# Patient Record
Sex: FEMALE | Race: WHITE | NOT HISPANIC OR LATINO | Employment: PART TIME | URBAN - METROPOLITAN AREA
[De-identification: names, ages, dates, MRNs, and addresses within clinical notes are randomized per-mention and may not be internally consistent; named-entity substitution may affect disease eponyms.]

---

## 2019-02-22 ENCOUNTER — OFFICE VISIT (OUTPATIENT)
Dept: URGENT CARE | Facility: PHYSICIAN GROUP | Age: 24
End: 2019-02-22
Payer: COMMERCIAL

## 2019-02-22 VITALS
OXYGEN SATURATION: 99 % | SYSTOLIC BLOOD PRESSURE: 120 MMHG | TEMPERATURE: 99 F | BODY MASS INDEX: 26.41 KG/M2 | WEIGHT: 131 LBS | HEART RATE: 79 BPM | HEIGHT: 59 IN | DIASTOLIC BLOOD PRESSURE: 68 MMHG

## 2019-02-22 DIAGNOSIS — M79.672 BILATERAL FOOT PAIN: ICD-10-CM

## 2019-02-22 DIAGNOSIS — M79.671 BILATERAL FOOT PAIN: ICD-10-CM

## 2019-02-22 DIAGNOSIS — R53.83 FATIGUE, UNSPECIFIED TYPE: ICD-10-CM

## 2019-02-22 DIAGNOSIS — M25.512 ACUTE PAIN OF LEFT SHOULDER: ICD-10-CM

## 2019-02-22 PROCEDURE — 99204 OFFICE O/P NEW MOD 45 MIN: CPT | Performed by: PHYSICIAN ASSISTANT

## 2019-02-22 RX ORDER — ACETAMINOPHEN 325 MG/1
650 TABLET ORAL EVERY 4 HOURS PRN
COMMUNITY

## 2019-02-22 ASSESSMENT — ENCOUNTER SYMPTOMS
RESPIRATORY NEGATIVE: 1
GASTROINTESTINAL NEGATIVE: 1
CONSTITUTIONAL NEGATIVE: 1
CARDIOVASCULAR NEGATIVE: 1

## 2019-02-22 NOTE — PROGRESS NOTES
"Subjective:      Makeda Arguello is a 23 y.o. female who presents with Foot Pain (bilateral foot pain, unable to apply pressure, tingling, L Shoulder pain, limited motion, x1 month )        HPI   1.  1 month of worsening bilateral foot pain.  Patient states pain was so bad yesterday she could barely walk.  She states her \"pinky toes went purple and had some tingling\"  No new shoes, job.  She is running a lot but she feels it is not excessive or more than her normal although is training for the [employer].  She bought gel inserts but did not help.    Patient states that her symptoms are worse in the morning and gets better throughout the day.   No heel pain specifically.   OTC medications not really helpful     2. Left shoulder pain x 1 month.  ROM is limited.   No specific injury or moment she felt it strained. Does go the gym and lift weights and feels this has been limited lately. .   No weakness, numbness or tingling of the left hand.    She has been taking Tylenol and Ibuprofen but not sure if helps too much.  No chronic shoulder issues or previous injury.       Patient notes concern for poor wound healing and admits her mom placed concern about her foot pain could be related to DM.     Review of Systems   Constitutional: Negative.    Respiratory: Negative.    Cardiovascular: Negative.    Gastrointestinal: Negative.    Musculoskeletal:        SEE HPI   Skin: Negative.    Neurological:        SEE HPI   Endo/Heme/Allergies: Negative.    All other systems reviewed and are negative.       PMH:  has no past medical history on file.  MEDS:   Current Outpatient Prescriptions:   •  Ibuprofen (ADVIL PO), Take  by mouth., Disp: , Rfl:   •  acetaminophen (TYLENOL) 325 MG Tab, Take 650 mg by mouth every four hours as needed., Disp: , Rfl:   ALLERGIES: No Known Allergies  SURGHX: History reviewed. No pertinent surgical history.  SOCHX:  reports that she has never smoked. She has never used smokeless tobacco. She " "reports that she uses drugs, including Marijuana. She reports that she does not drink alcohol.  FH: Family history was reviewed, no pertinent findings to report   Objective:     /68 (BP Location: Left arm, Patient Position: Sitting, BP Cuff Size: Adult)   Pulse 79   Temp 37.2 °C (99 °F) (Temporal)   Ht 1.499 m (4' 11\")   Wt 59.4 kg (131 lb)   SpO2 99%   BMI 26.46 kg/m²      Physical Exam   Constitutional: She is oriented to person, place, and time. She appears well-developed and well-nourished. No distress.   HENT:   Head: Normocephalic and atraumatic.   Right Ear: External ear normal.   Left Ear: External ear normal.   Nose: Nose normal.   Eyes: Conjunctivae and EOM are normal.   Neck: Normal range of motion. Neck supple.   Cardiovascular: Normal rate and regular rhythm.    Pulmonary/Chest: Effort normal and breath sounds normal.   Musculoskeletal:        Left shoulder: She exhibits decreased range of motion (by 50% in all planes both passive and active. ). She exhibits no bony tenderness and no swelling.        Arms:       Right foot: There is tenderness (mild diffuse plantar. ). There is normal range of motion, no bony tenderness and normal capillary refill.        Left foot: There is tenderness (mild diffuse plantar). There is normal range of motion, no swelling and normal capillary refill.   Neurological: She is alert and oriented to person, place, and time.   Skin: Skin is warm and dry. No rash noted.   Psychiatric: She has a normal mood and affect. Her behavior is normal.   Vitals reviewed.    Component Results     Component Value Ref Range & Units Status   WBC 8.8  3.4 - 10.8 x10E3/uL Final   RBC 5.03  3.77 - 5.28 x10E6/uL Final   Hemoglobin 14.9  11.1 - 15.9 g/dL Final   Hematocrit 42.7  34.0 - 46.6 % Final   MCV 85  79 - 97 fL Final   MCH 29.6  26.6 - 33.0 pg Final   MCHC 34.9  31.5 - 35.7 g/dL Final   RDW 14.5  12.3 - 15.4 % Final   Neutrophils-Polys 64  Not Estab. % Final   Lymphocytes 28  " Not Estab. % Final   Monocytes 6  Not Estab. % Final   Eosinophils 1  Not Estab. % Final   Basophils 1  Not Estab. % Final   Immature Cells CANCELED      Comment:   Result canceled by the ancillary   Neutrophils (Absolute) 5.7  1.4 - 7.0 x10E3/uL Final   Lymphs (Absolute) 2.4  0.7 - 3.1 x10E3/uL Final   Monos (Absolute) 0.6  0.1 - 0.9 x10E3/uL Final   Eos (Absolute) 0.1  0.0 - 0.4 x10E3/uL Final   Baso (Absolute) 0.1  0.0 - 0.2 x10E3/uL Final   Immature Granulocytes 0  Not Estab. % Final   Immature Granulocytes (abs) 0.0  0.0 - 0.1 x10E3/uL Final         Component Results     Component Value Ref Range & Units Status   Glucose 90  65 - 99 mg/dL Final   Bun 13  6 - 20 mg/dL Final   Creatinine 0.71  0.57 - 1.00 mg/dL Final   GFR If Non African American 121  >59 mL/min/1.73 Final   GFR If African American 140  >59 mL/min/1.73 Final   Bun-Creatinine Ratio 18  9 - 23 Final   Sodium 139  134 - 144 mmol/L Final   Potassium 4.9  3.5 - 5.2 mmol/L Final   Chloride 104  96 - 106 mmol/L Final   Co2 21  20 - 29 mmol/L Final   Calcium 9.8  8.7 - 10.2 mg/dL Final   Total Protein 8.2  6.0 - 8.5 g/dL Final   Albumin 4.9  3.5 - 5.5 g/dL Final   Globulin 3.3  1.5 - 4.5 g/dL Final   A-G Ratio 1.5  1.2 - 2.2 Final   Total Bilirubin 0.5  0.0 - 1.2 mg/dL Final   Alkaline Phosphatase 63  39 - 117 IU/L Final   AST(SGOT) 30  0 - 40 IU/L Final   ALT(SGPT) 36   0 - 32 IU/L Final     Component Results     Component Value Ref Range & Units Status   Sed Rate Westergren 2  0 - 32 mm/hr Final        Assessment/Plan:     1. Acute pain of left shoulder  MethylPREDNISolone (MEDROL DOSEPAK) 4 MG Tablet Therapy Pack    REFERRAL TO SPORTS MEDICINE   2. Bilateral foot pain  WESTERGREN SED RATE   3. Fatigue, unspecified type  CBC WITH DIFFERENTIAL    Comp Metabolic Panel       -labs grossly unremarkable.   -discussed with patient to d/c running at this time and switch to lower impact cardio for now.   Also recommend shoulder weight lifting for now.       -Rest, stretches.    -Medrol dose pack for acute inflammation   -referral to follow up with Sports Med.      Supportive care, differential diagnoses, and indications for immediate follow-up discussed with patient.   Pathogenesis of diagnosis discussed including typical length and natural progression.   Instructed to return to clinic or nearest emergency department for any change in condition, further concerns, or worsening of symptoms.  Patient states understanding of the plan of care and discharge instructions.        Meenu Aranda P.A.-C.

## 2019-02-24 LAB
ALBUMIN SERPL-MCNC: 4.9 G/DL (ref 3.5–5.5)
ALBUMIN/GLOB SERPL: 1.5 {RATIO} (ref 1.2–2.2)
ALP SERPL-CCNC: 63 IU/L (ref 39–117)
ALT SERPL-CCNC: 36 IU/L (ref 0–32)
AMBIG ABBREV CMP14 DFLT   977206: NORMAL
AST SERPL-CCNC: 30 IU/L (ref 0–40)
BASOPHILS # BLD AUTO: 0.1 X10E3/UL (ref 0–0.2)
BASOPHILS NFR BLD AUTO: 1 %
BILIRUB SERPL-MCNC: 0.5 MG/DL (ref 0–1.2)
BUN SERPL-MCNC: 13 MG/DL (ref 6–20)
BUN/CREAT SERPL: 18 (ref 9–23)
CALCIUM SERPL-MCNC: 9.8 MG/DL (ref 8.7–10.2)
CHLORIDE SERPL-SCNC: 104 MMOL/L (ref 96–106)
CO2 SERPL-SCNC: 21 MMOL/L (ref 20–29)
CREAT SERPL-MCNC: 0.71 MG/DL (ref 0.57–1)
EOSINOPHIL # BLD AUTO: 0.1 X10E3/UL (ref 0–0.4)
EOSINOPHIL NFR BLD AUTO: 1 %
ERYTHROCYTE [DISTWIDTH] IN BLOOD BY AUTOMATED COUNT: 14.5 % (ref 12.3–15.4)
ERYTHROCYTE [SEDIMENTATION RATE] IN BLOOD BY WESTERGREN METHOD: 2 MM/HR (ref 0–32)
GLOBULIN SER CALC-MCNC: 3.3 G/DL (ref 1.5–4.5)
GLUCOSE SERPL-MCNC: 90 MG/DL (ref 65–99)
HCT VFR BLD AUTO: 42.7 % (ref 34–46.6)
HGB BLD-MCNC: 14.9 G/DL (ref 11.1–15.9)
IMM GRANULOCYTES # BLD AUTO: 0 X10E3/UL (ref 0–0.1)
IMM GRANULOCYTES NFR BLD AUTO: 0 %
IMMATURE CELLS  115398: NORMAL
LYMPHOCYTES # BLD AUTO: 2.4 X10E3/UL (ref 0.7–3.1)
LYMPHOCYTES NFR BLD AUTO: 28 %
MCH RBC QN AUTO: 29.6 PG (ref 26.6–33)
MCHC RBC AUTO-ENTMCNC: 34.9 G/DL (ref 31.5–35.7)
MCV RBC AUTO: 85 FL (ref 79–97)
MONOCYTES # BLD AUTO: 0.6 X10E3/UL (ref 0.1–0.9)
MONOCYTES NFR BLD AUTO: 6 %
MORPHOLOGY BLD-IMP: NORMAL
NEUTROPHILS # BLD AUTO: 5.7 X10E3/UL (ref 1.4–7)
NEUTROPHILS NFR BLD AUTO: 64 %
NRBC BLD AUTO-RTO: NORMAL %
POTASSIUM SERPL-SCNC: 4.9 MMOL/L (ref 3.5–5.2)
PROT SERPL-MCNC: 8.2 G/DL (ref 6–8.5)
RBC # BLD AUTO: 5.03 X10E6/UL (ref 3.77–5.28)
SODIUM SERPL-SCNC: 139 MMOL/L (ref 134–144)
WBC # BLD AUTO: 8.8 X10E3/UL (ref 3.4–10.8)

## 2019-02-24 RX ORDER — METHYLPREDNISOLONE 4 MG/1
TABLET ORAL
Qty: 21 TAB | Refills: 0 | Status: SHIPPED | OUTPATIENT
Start: 2019-02-24 | End: 2019-09-02

## 2019-03-03 ENCOUNTER — OFFICE VISIT (OUTPATIENT)
Dept: URGENT CARE | Facility: PHYSICIAN GROUP | Age: 24
End: 2019-03-03
Payer: COMMERCIAL

## 2019-03-03 VITALS
HEART RATE: 86 BPM | DIASTOLIC BLOOD PRESSURE: 80 MMHG | OXYGEN SATURATION: 97 % | BODY MASS INDEX: 25.36 KG/M2 | TEMPERATURE: 99.3 F | WEIGHT: 125.8 LBS | HEIGHT: 59 IN | SYSTOLIC BLOOD PRESSURE: 120 MMHG

## 2019-03-03 DIAGNOSIS — M79.672 BILATERAL FOOT PAIN: ICD-10-CM

## 2019-03-03 DIAGNOSIS — M25.512 ACUTE PAIN OF LEFT SHOULDER: ICD-10-CM

## 2019-03-03 DIAGNOSIS — M79.671 BILATERAL FOOT PAIN: ICD-10-CM

## 2019-03-03 PROCEDURE — 99213 OFFICE O/P EST LOW 20 MIN: CPT | Performed by: NURSE PRACTITIONER

## 2019-03-03 RX ORDER — IBUPROFEN 800 MG/1
800 TABLET ORAL EVERY 8 HOURS PRN
Qty: 90 TAB | Refills: 2 | Status: SHIPPED | OUTPATIENT
Start: 2019-03-03 | End: 2019-09-02

## 2019-03-03 ASSESSMENT — ENCOUNTER SYMPTOMS
CHILLS: 0
MYALGIAS: 1
TINGLING: 0
FEVER: 0
SENSORY CHANGE: 0

## 2019-03-07 ENCOUNTER — APPOINTMENT (OUTPATIENT)
Dept: RADIOLOGY | Facility: IMAGING CENTER | Age: 24
End: 2019-03-07
Attending: PHYSICIAN ASSISTANT
Payer: COMMERCIAL

## 2019-03-07 ENCOUNTER — OFFICE VISIT (OUTPATIENT)
Dept: URGENT CARE | Facility: PHYSICIAN GROUP | Age: 24
End: 2019-03-07
Payer: COMMERCIAL

## 2019-03-07 VITALS
HEART RATE: 83 BPM | SYSTOLIC BLOOD PRESSURE: 120 MMHG | BODY MASS INDEX: 25.04 KG/M2 | DIASTOLIC BLOOD PRESSURE: 98 MMHG | WEIGHT: 124.2 LBS | OXYGEN SATURATION: 96 % | TEMPERATURE: 99.2 F | HEIGHT: 59 IN

## 2019-03-07 DIAGNOSIS — R10.13 EPIGASTRIC PAIN: ICD-10-CM

## 2019-03-07 DIAGNOSIS — K27.3: ICD-10-CM

## 2019-03-07 PROCEDURE — 74019 RADEX ABDOMEN 2 VIEWS: CPT | Mod: TC | Performed by: PHYSICIAN ASSISTANT

## 2019-03-07 PROCEDURE — 99214 OFFICE O/P EST MOD 30 MIN: CPT | Performed by: PHYSICIAN ASSISTANT

## 2019-03-07 RX ORDER — OMEPRAZOLE 40 MG/1
40 CAPSULE, DELAYED RELEASE ORAL DAILY
Qty: 30 CAP | Refills: 1 | Status: SHIPPED | OUTPATIENT
Start: 2019-03-07 | End: 2019-11-04

## 2019-03-07 ASSESSMENT — ENCOUNTER SYMPTOMS
CHILLS: 0
HEMATOCHEZIA: 0
EYE PAIN: 0
FEVER: 0
COUGH: 0
NAUSEA: 1
HEADACHES: 0
EYE DISCHARGE: 0
ANOREXIA: 1
ABDOMINAL PAIN: 1
SORE THROAT: 0
VOMITING: 0
SHORTNESS OF BREATH: 0

## 2019-03-08 NOTE — PROGRESS NOTES
Subjective:      Makeda Arguello is a 23 y.o. female who presents with Abdominal Pain (poss chest pain, sharp pain, nausea, x3 days )            Abdominal Pain   This is a new problem. The onset quality is gradual. The problem occurs constantly. The problem has been unchanged. The pain is located in the epigastric region. The pain is moderate. The quality of the pain is dull and aching. Associated symptoms include anorexia, melena and nausea. Pertinent negatives include no dysuria, fever, frequency, headaches, hematochezia or vomiting.     Patient presents to clinic c/o upper abdominal pain with black stools x 3 days. She was recently prescribed 800mg IBU for joint pain. She took this medication for 3 days, but stopped when she started developing abdominal pain and black stools. Patient states she is having 2 bowel movements per day, and all of her stools have been black for the past 3 days. She denies nausea/vomiting and fever/chills. Patient states the pain is better with eating, but the pain returns shortly after.   She denies hx of same.       PMH:  has no past medical history on file.  MEDS:   Current Outpatient Prescriptions:   •  MethylPREDNISolone (MEDROL DOSEPAK) 4 MG Tablet Therapy Pack, As directed on the packaging label., Disp: 21 Tab, Rfl: 0  •  Ibuprofen (ADVIL PO), Take  by mouth., Disp: , Rfl:   •  acetaminophen (TYLENOL) 325 MG Tab, Take 650 mg by mouth every four hours as needed., Disp: , Rfl:   •  ibuprofen (MOTRIN) 800 MG Tab, Take 1 Tab by mouth every 8 hours as needed. (Patient not taking: Reported on 3/7/2019), Disp: 90 Tab, Rfl: 2  ALLERGIES: No Known Allergies  SURGHX: History reviewed. No pertinent surgical history.  SOCHX:  reports that she has never smoked. She has never used smokeless tobacco. She reports that she uses drugs, including Marijuana. She reports that she does not drink alcohol.  FH: Family history was reviewed, no pertinent findings to report       Review of Systems  "  Constitutional: Negative for chills and fever.   HENT: Negative for congestion, ear pain and sore throat.    Eyes: Negative for pain and discharge.   Respiratory: Negative for cough and shortness of breath.    Gastrointestinal: Positive for abdominal pain, anorexia, melena and nausea. Negative for hematochezia and vomiting.   Genitourinary: Negative for dysuria, frequency and urgency.   Skin: Negative for itching and rash.   Neurological: Negative for headaches.          Objective:     /98 (BP Location: Right arm, Patient Position: Sitting, BP Cuff Size: Adult)   Pulse 83   Temp 37.3 °C (99.2 °F) (Temporal)   Ht 1.499 m (4' 11\")   Wt 56.3 kg (124 lb 3.2 oz)   SpO2 96%   BMI 25.09 kg/m²      Physical Exam   Constitutional: She is oriented to person, place, and time. She appears well-developed and well-nourished. No distress.   HENT:   Head: Normocephalic and atraumatic.   Right Ear: External ear normal.   Left Ear: External ear normal.   Mouth/Throat: Oropharynx is clear and moist.   Eyes: Conjunctivae and EOM are normal.   Neck: Normal range of motion. Neck supple.   Cardiovascular: Normal rate, regular rhythm and normal heart sounds.    Pulmonary/Chest: Effort normal and breath sounds normal.   Abdominal: Soft. Bowel sounds are normal. There is generalized tenderness. There is guarding. There is no rigidity, no rebound and no CVA tenderness.   Musculoskeletal: Normal range of motion.   Neurological: She is alert and oriented to person, place, and time.   Skin: Skin is warm and dry.          Progress:  Abdominal XR:  FINDINGS:  There is no evidence of bowel obstruction.  There is no evidence of free intraperitoneal air.  No abnormal calcifications are seen.      Impression       No evidence of bowel obstruction.            Assessment/Plan:     1.Peptic Ulcer   The patient's presenting symptoms and physical exam are consistent with a bleeding peptic ulcer secondary to ibuprofen use. The patient's " upright abdominal x-ray showed no free intraperitoneal air at this time, indicating a peptic ulcer perforation is less likely. Additionally, the patient does not have any peritoneal signs on physical exam, including no abdominal rigidity, rebound tenderness, and/or pain with movement. She is also not experiencing any fever/chills or nausea/vomiting.   Discussed strict return precautions with patient and she verbalized understanding. Patient is to go straight to the ED if she should experience any worsening/increasing abdominal pain, abdominal pain with movement, rigidity of her abdomen, fever/chills, nausea/vomiting, worsening/increasing of melena, feeling lightheaded/dizzy and/or shortness of breath. The patient is stable at this time, and safe for discharge with close outpatient follow-up and strict return precautions.  Plan:   Omeprazole 40mg PO QDay x 8 weeks   Avoid all NSAIDs - including Motrin, IBU, Advil, Aleve, and Aspirin  Referral to GI  Follow-up with PCP regarding symptoms  Return to clinic or go to the ED if symptoms worsen or fail to improve, or if the patient should develop any worsening/increasing abdominal pain, abdominal pain with movement, rigidity of her abdomen, fever/chills, nausea/vomiting, worsening/increasing of melena, feeling lightheaded/dizzy and/or shortness of breath.

## 2019-03-14 ENCOUNTER — TELEPHONE (OUTPATIENT)
Dept: URGENT CARE | Facility: PHYSICIAN GROUP | Age: 24
End: 2019-03-14

## 2019-03-14 NOTE — TELEPHONE ENCOUNTER
1. Caller Name: Makeda Arguello                                         Call Back Number: [phone] (home)       Patient approves a detailed voicemail message: yes    Pt came in stating that she has not been going to the gym since she has been in pain from her ulcer and from her other visit of feet pain.  Her gym states they can waive charging her for the month if she gets a note from the provider she saw that says she is unable to work out.  Please Advise.  Thanks

## 2019-03-23 ENCOUNTER — TELEPHONE (OUTPATIENT)
Dept: URGENT CARE | Facility: PHYSICIAN GROUP | Age: 24
End: 2019-03-23

## 2019-03-23 NOTE — TELEPHONE ENCOUNTER
Called pt to notify them about setting up an apt with primary care to get a letter excusing her from the gym. Pt did not answer LVM to call back. Discussed with Mercedes that the first provider that saw her should write her a note for the excuse but if that is not possible we should set her up an apt with primary care. Thanks!

## 2019-09-02 ENCOUNTER — OCCUPATIONAL MEDICINE (OUTPATIENT)
Dept: URGENT CARE | Facility: CLINIC | Age: 24
End: 2019-09-02
Payer: COMMERCIAL

## 2019-09-02 ENCOUNTER — APPOINTMENT (OUTPATIENT)
Dept: RADIOLOGY | Facility: IMAGING CENTER | Age: 24
End: 2019-09-02
Attending: NURSE PRACTITIONER
Payer: COMMERCIAL

## 2019-09-02 ENCOUNTER — NON-PROVIDER VISIT (OUTPATIENT)
Dept: URGENT CARE | Facility: CLINIC | Age: 24
End: 2019-09-02
Payer: COMMERCIAL

## 2019-09-02 VITALS
BODY MASS INDEX: 25 KG/M2 | DIASTOLIC BLOOD PRESSURE: 78 MMHG | WEIGHT: 124 LBS | HEART RATE: 55 BPM | SYSTOLIC BLOOD PRESSURE: 108 MMHG | RESPIRATION RATE: 18 BRPM | OXYGEN SATURATION: 99 % | TEMPERATURE: 98.2 F | HEIGHT: 59 IN

## 2019-09-02 DIAGNOSIS — S61.412A LACERATION OF LEFT HAND WITHOUT FOREIGN BODY, INITIAL ENCOUNTER: ICD-10-CM
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
## 2019-09-02 DIAGNOSIS — Z02.1 PRE-EMPLOYMENT DRUG SCREENING: ICD-10-CM

## 2019-09-02 LAB
AMP AMPHETAMINE: NORMAL
BAR BARBITURATES: NORMAL
BREATH ALCOHOL COMMENT: NORMAL
BZO BENZODIAZEPINES: NORMAL
COC COCAINE: NORMAL
INT CON NEG: NORMAL
INT CON POS: NORMAL
MDMA ECSTASY: NORMAL
MET METHAMPHETAMINES: NORMAL
MTD METHADONE: NORMAL
OPI OPIATES: NORMAL
OXY OXYCODONE: NORMAL
PCP PHENCYCLIDINE: NORMAL
POC BREATHALIZER: 0 PERCENT (ref 0–0.01)
POC URINE DRUG SCREEN OCDRS: POSITIVE
THC: POSITIVE

## 2019-09-02 PROCEDURE — 99214 OFFICE O/P EST MOD 30 MIN: CPT | Mod: 25 | Performed by: NURSE PRACTITIONER

## 2019-09-02 PROCEDURE — 12001 RPR S/N/AX/GEN/TRNK 2.5CM/<: CPT | Performed by: NURSE PRACTITIONER

## 2019-09-02 PROCEDURE — 82075 ASSAY OF BREATH ETHANOL: CPT | Performed by: NURSE PRACTITIONER

## 2019-09-02 PROCEDURE — 73130 X-RAY EXAM OF HAND: CPT | Mod: TC,LT | Performed by: NURSE PRACTITIONER

## 2019-09-02 PROCEDURE — 80305 DRUG TEST PRSMV DIR OPT OBS: CPT | Performed by: NURSE PRACTITIONER

## 2019-09-02 NOTE — PROGRESS NOTES
"  Chief Complaint   Patient presents with   • Hand Injury     NEW [place] DOI-9/1/19-(L) laceration UTD on tetanus       HISTORY OF PRESENT ILLNESS: Patient is a 24 y.o. female who presents to urgent care today with a work comp complaint of a laceration. 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 She then pulled the knife immediately out, she believes it was lodged in approx 0.5 inches. She immediatly developed pain and has had pain since. Admits to intermittent tingling to fingers. She washed the wound out immediately and the placed a bandage. Her Tetanus booster is UTD.         PMH: No pertinent past medical history to this problem  MEDS: Medications were reviewed in Epic  ALLERGIES: Allergies were reviewed in Epic  SOCHX: Works as a cook at [employer]   FH: No pertinent family history to this problem      ROS:  Review of Systems   Constitutional: Negative for fever, chills, weight loss, malaise, and fatigue.   HENT: Negative for ear pain, nosebleeds, congestion, sore throat and neck pain.    Eyes: Negative for vision changes.   Neuro: Negative for headache, sensory changes, weakness, seizure, LOC.   Cardiovascular: Negative for chest pain, palpitations, orthopnea and leg swelling.   Respiratory: Negative for cough, sputum production, shortness of breath and wheezing.   Gastrointestinal: Negative for abdominal pain, nausea, vomiting or diarrhea.   Genitourinary: Negative for dysuria, urgency and frequency.  Musculoskeletal: Negative for falls, neck pain, back pain, joint pain, myalgias.   Skin: Positive for laceration. Negative for rash, diaphoresis.     Exam:  /78 (BP Location: Left arm)   Pulse (!) 55   Temp 36.8 °C (98.2 °F) (Temporal)   Resp 18   Ht 1.499 m (4' 11\")   Wt 56.2 kg (124 lb)   SpO2 99%   General: well-nourished, well-developed female in NAD  Head: normocephalic, atraumatic  Eyes: PERRLA, no " conjunctival injection, acuity grossly intact, lids normal.  Ears: normal shape and symmetry, no tenderness, no discharge. External canals are without any significant edema or erythema. Tympanic membranes are without any inflammation, no effusion. Gross auditory acuity is intact.  Nose: symmetrical without tenderness, no discharge.  Mouth/Throat: reasonable hygiene, no erythema, exudates or tonsillar enlargement.  Neck: no masses, range of motion within normal limits, no tracheal deviation. No obvious thyroid enlargement.   Lymph: no cervical adenopathy. No supraclavicular adenopathy.   Neuro: alert and oriented. Cranial nerves 1-12 grossly intact. No sensory deficit.   Cardiovascular: regular rate and rhythm. No edema.  Pulmonary: no distress. Chest is symmetrical with respiration, no wheezes, crackles, or rhonchi.   Musculoskeletal: no clubbing, appropriate muscle tone, gait is stable. There is a 1cm full thickness linear laceration to webbing between first and second metacarpals. Bleeding controlled, no foreign bodies. Hand and fingers have FROM, strength 5/5. N/V intact, cap refill brisk.   Skin: warm, no clubbing, no cyanosis, no rashes. +Laceration as noted above.   Psych: appropriate mood, affect, judgement.         Assessment/Plan:  1. Laceration of left hand without foreign body, initial encounter  DX-HAND 3+ LEFT       Laceration Repair Procedure Note      Indication: Hand Laceration     Procedure: Risks including bleeding, nerve damage, infection, and poor cosmetic outcome discussed at length. Benefits and alternatives discussed. The patient was placed in the appropriate position and anesthesia around the laceration was obtained by infiltration using 2% Lidocaine without epinephrine. The area was then cleansed with betadine and draped in a sterile fashion and irrigated with normal saline. The laceration was closed with #2 5-0 Nylon using interrupted sutures with good wound approximation. There were no  additional lacerations requiring repair. The wound area was then dressed with a bandage.     Total repaired wound length: 1cm     Other Items: Suture count: 2     The patient tolerated the procedure well.     Complications: None      Laceration repaired, wound care, OTC tylenol, RICE, work restrictions, RTC in 4 days for re-eval.   Supportive care, differential diagnoses, and indications for immediate follow-up discussed with patient.   Pathogenesis of diagnosis discussed including typical length and natural progression.   Instructed to return to clinic or nearest emergency department sooner for any change in condition, further concerns, or worsening of symptoms.  Patient states understanding of the plan of care and discharge instructions.          Please note that this dictation was created using voice recognition software. I have made every reasonable attempt to correct obvious errors, but I expect that there are errors of grammar and possibly content that I did not discover before finalizing the note.      KELLI Wood.

## 2019-09-02 NOTE — LETTER
[facility] [place] [id number] [address]  Phone:  [phone] - Fax:  [phone]   [facility] Progress Report and Disability Certification  Date of Service: 9/2/2019   No Show:  No  Date / Time of Next Visit: 9/5/2019   Claim Information   Patient Name: Makeda Arguello  Claim Number:     Employer: [employer]  Date of Injury: 9/1/2019     Insurer / TPA: [employer]  ID / SSN:     Occupation: Cook  Diagnosis: The encounter diagnosis was Laceration of left hand without foreign body, initial encounter.    Medical Information   Related to Industrial Injury? 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 She then pulled the knife immediately out, she believes it was lodged in approx 0.5 inches. She immediatly developed pain and has had pain since. Admits to intermittent tingling to fingers. She washed the wound out immediately and the placed a bandage. Her Tetanus booster is UTD.    Objective Findings: A/Ox4. NAD. There is a 1cm full thickness linear laceration to webbing between first and second metacarpals. Bleeding controlled, no foreign bodies. Hand and fingers have FROM, strength 5/5. N/V intact, cap refill brisk.    Pre-Existing Condition(s): Denies    Assessment:   Initial Visit    Status: Additional Care Required  Permanent Disability:No    Plan: Medication  Comments:Laceration repaired, wound care, OTC tylenol, RICE, work restrictions, RTC in 4 days for re-eval.     Diagnostics: X-ray  Comments:hand negative    Comments:       Disability Information   Status: Released to Restricted Duty    From:  9/2/2019  Through: 9/5/2019 Restrictions are: Temporary   Physical Restrictions   Sitting:    Standing:    Stooping:    Bending:      Squatting:    Walking:    Climbing:    Pushing:  < or = to 1  hr/day  Comments:left hand   Pulling:  < or = to 1 hr/day  Comments:left hand Other:    Reaching Above Shoulder (L):   Reaching Above Shoulder (R):       Reaching Below Shoulder (L):    Reaching Below Shoulder (R):      Not to exceed Weight Limits   Carrying(hrs): 1  Comments:left hand Weight Limit(lb): < or = to 10 pounds  Comments:left hand Lifting(hrs): 1  Comments:left hand Weight  Limit(lb): < or = to 10 pounds  Comments:left hand   Comments: Must keep hand clean and dry while at work.     Repetitive Actions   Hands: i.e. Fine Manipulations from Grasping: < or = to 1 hr/day  Comments:left hand   Feet: i.e. Operating Foot Controls:     Driving / Operate Machinery:     Physician Name: SHAHID Wood Physician Signature: VENKATA Jeff e-Signature: Dr. Alirio Mora, Medical Director   Clinic Name / Location: [address] [address] Clinic Phone Number: Dept: [phone]   Appointment Time: 11:00 Am Visit Start Time: 12:53 PM   Check-In Time:  11:01 Am Visit Discharge Time:  2:07pm   Original-Treating Physician or Chiropractor    Page 2-Insurer/TPA    Page 3-Employer    Page 4-Employee

## 2019-09-02 NOTE — LETTER
"EMPLOYEE’S CLAIM FOR COMPENSATION/ REPORT OF INITIAL TREATMENT  FORM C-4    EMPLOYEE’S CLAIM - PROVIDE ALL INFORMATION REQUESTED   First Name  Makeda Last Name  Saundra Birthdate                    1995                Sex  female Claim Number   Home Address  [address] Age  24 y.o. 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 (if applicable)  taking lid off vinegar    How did this injury or occupational disease occur? (Be specific an answer in detail. Use additional sheet if necessary)  I was trying to take the seal off of the vingegar and it was not coming off so i used a knife to   pry it and then it went in my hang   If you believe that you have an occupational disease, when did you first have knowledge of the disability and it relationship to your employment?  n/a Witnesses to the Accident  n/a      Nature of Injury or Occupational Disease  Laceration  Part(s) of Body Injured or Affected  Hand (L), N/A, N/A    I certify that the above is true and correct to the best of my knowledge and that I have provided this " information in order to obtain the benefits of [place]  I hereby authorize any physician, chiropractor, surgeon, practitioner, or other person, any hospital, including [facility] or [facility] hospital, any medical service organization, any insurance company, or other institution or organization to release to each other, any medical or other information, including benefits paid or payable, pertinent to this injury or disease, except information relative to diagnosis, treatment and/or counseling for AIDS, psychological conditions, alcohol or controlled substances, for which I must give specific authorization.  A Photostat of this authorization shall be as valid as the original.     Date   Place   Employee’s Signature   THIS REPORT MUST BE COMPLETED AND MAILED WITHIN 3 WORKING DAYS OF TREATMENT   Place  [facility]  Name of [facility]   Date  9/2/2019 Diagnosis  (S61.412A) Laceration of left hand without foreign body, initial encounter Is there evidence the injured employee was under the influence of alcohol and/or another controlled substance at the time of accident?   Hour  12:53 PM Description of Injury or Disease  The encounter diagnosis was Laceration of left hand without foreign body, initial encounter. No   Treatment  Laceration repaired, wound care, OTC tylenol, RICE, work restrictions, RTC in 4 days for re-eval.   Have you advised the patient to remain off work five days or more? No   X-Ray Findings  Negative   If Yes   From Date  To Date      From information given by the employee, together with medical evidence, can you directly connect this injury or occupational disease as job incurred?  Yes If No Full Duty  No Modified Duty  Yes   Is additional medical care by a physician indicated?  Yes If Modified Duty, Specify any Limitations / Restrictions  Per D-39   Do you know  "of any previous injury or disease contributing to this condition or occupational disease?                            No   Date  9/2/2019 Print Doctor’s Name SHAHID Wood I certify the employer’s copy of  this form was mailed on:   Address  [address] Insurer’s Use Only     [place] Zip  [address]    Provider’s Tax ID Number  [id number] Telephone  Dept: [phone]        chaim-VENKATA Silverio   e-Signature: Dr. Alirio Mora, Medical Director Degree  MD        ORIGINAL-TREATING PHYSICIAN OR CHIROPRACTOR    PAGE 2-INSURER/TPA    PAGE 3-EMPLOYER    PAGE 4-EMPLOYEE             Form C-4 (rev10/07)              BRIEF DESCRIPTION OF RIGHTS AND BENEFITS  (Pursuant to [place] 616C.050)    Notice of Injury or Occupational Disease (Incident Report Form C-1): If an injury or occupational disease (OD) arises out of and in the  course of employment, you must provide written notice to your employer as soon as practicable, but no later than 7 days after the accident or  OD. Your employer shall maintain a sufficient supply of the required forms.    Claim for Compensation (Form C-4): If medical treatment is sought, the form C-4 is available at the place of initial treatment. A completed  \"Claim for Compensation\" (Form C-4) must be filed within 90 days after an accident or OD. The treating physician or chiropractor must,  within 3 working days after treatment, complete and mail to the employer, the employer's insurer and third-party , the Claim for  Compensation.    Medical Treatment: If you require medical treatment for your on-the-job injury or OD, you may be required to select a physician or  chiropractor from a list provided by your workers’ compensation insurer, if it has contracted with an Organization for Managed Care (MCO) or  Preferred Provider Organization (PPO) or providers of health care. If your employer has not entered into a contract with an MCO or PPO, you  may select a " physician or chiropractor from the Panel of Physicians and Chiropractors. Any medical costs related to your industrial injury or  OD will be paid by your insurer.    Temporary Total Disability (TTD): If your doctor has certified that you are unable to work for a period of at least 5 consecutive days, or 5  cumulative days in a 20-day period, or places restrictions on you that your employer does not accommodate, you may be entitled to TTD  compensation.    Temporary Partial Disability (TPD): If the wage you receive upon reemployment is less than the compensation for TTD to which you are  entitled, the insurer may be required to pay you TPD compensation to make up the difference. TPD can only be paid for a maximum of 24  months.    Permanent Partial Disability (PPD): When your medical condition is stable and there is an indication of a PPD as a result of your injury or  OD, within 30 days, your insurer must arrange for an evaluation by a rating physician or chiropractor to determine the degree of your PPD. The  amount of your PPD award depends on the date of injury, the results of the PPD evaluation and your age and wage.    Permanent Total Disability (PTD): If you are medically certified by a treating physician or chiropractor as permanently and totally disabled  and have been granted a PTD status by your insurer, you are entitled to receive monthly benefits not to exceed 66 2/3% of your average  monthly wage. The amount of your PTD payments is subject to reduction if you previously received a PPD award.    Vocational Rehabilitation Services: You may be eligible for vocational rehabilitation services if you are unable to return to the job due to a  permanent physical impairment or permanent restrictions as a result of your injury or occupational disease.    Transportation and Per Erika Reimbursement: You may be eligible for travel expenses and per erika associated with medical treatment.    Reopening: You may be able  to reopen your claim if your condition worsens after claim closure.    Appeal Process: If you disagree with a written determination issued by the insurer or the insurer does not respond to your request, you may  appeal to the Department of Administration, , by following the instructions contained in your determination letter. You must  appeal the determination within 70 days from the date of the determination letter at [address], [address], [place], [place]  [address], or [address], [place], [place] [address]. If you disagree with the  decision, you may appeal to the  Department of Administration, . You must file your appeal within 30 days from the date of the  decision  letter at [address], [address], [place], [place] [address], or [address], [place], [place] [address]. If you  disagree with a decision of an , you may file a petition for judicial review with the District Court. You must do so within 30  days of the Appeal Officer’s decision. You may be represented by an  at your own expense or you may contact the [facility] for possible  representation.    [place]  for Injured Workers (NAIW): If you disagree with a  decision, you may request that NAIW represent you  without charge at an  Hearing. For information regarding denial of benefits, you may contact the [facility] at: [address], [place] [address], [phone], or [address], [place] [address], [phone]    To File a Complaint with the Division: If you wish to file a complaint with the  of the Division of Industrial Relations (DIR),  please contact the Workers’ Compensation Section, [address], [address], [place], [place] [address], telephone [phone]) [phone], or  [address], [address],  [place], [place] [address], telephone [phone].    For assistance with Workers’ Compensation Issues: you may contact the Office of the Governor Consumer Health Assistance, [address] [place] [address], [address], [place], [place] [address], Toll Free 1-315.336.2899, Web site: http://PPDaicha.[facility].nv., E-mail  [email or web address].[email or web address].                                                                                                                                                                                                                                   __________________________________________________________________                                                                   _________________                Employee Name / Signature                                                                                                                                                       Date                                                                                                                                                                                                     D-2 (rev. 10/07)

## 2019-09-05 ENCOUNTER — HOSPITAL ENCOUNTER (OUTPATIENT)
Dept: LAB | Facility: MEDICAL CENTER | Age: 24
End: 2019-09-05
Attending: FAMILY MEDICINE
Payer: COMMERCIAL

## 2019-09-05 ENCOUNTER — OCCUPATIONAL MEDICINE (OUTPATIENT)
Dept: URGENT CARE | Facility: CLINIC | Age: 24
End: 2019-09-05
Payer: COMMERCIAL

## 2019-09-05 VITALS
WEIGHT: 123 LBS | HEIGHT: 59 IN | HEART RATE: 64 BPM | RESPIRATION RATE: 16 BRPM | OXYGEN SATURATION: 97 % | TEMPERATURE: 97.7 F | SYSTOLIC BLOOD PRESSURE: 110 MMHG | DIASTOLIC BLOOD PRESSURE: 72 MMHG | BODY MASS INDEX: 24.8 KG/M2

## 2019-09-05 DIAGNOSIS — S61.412D LACERATION OF LEFT HAND WITHOUT FOREIGN BODY, SUBSEQUENT ENCOUNTER: ICD-10-CM

## 2019-09-05 LAB
ALBUMIN SERPL BCP-MCNC: 4.5 G/DL (ref 3.2–4.9)
ALBUMIN/GLOB SERPL: 1.3 G/DL
ALP SERPL-CCNC: 46 U/L (ref 30–99)
ALT SERPL-CCNC: 38 U/L (ref 2–50)
AMYLASE SERPL-CCNC: 70 U/L (ref 20–103)
ANION GAP SERPL CALC-SCNC: 8 MMOL/L (ref 0–11.9)
AST SERPL-CCNC: 28 U/L (ref 12–45)
BASOPHILS # BLD AUTO: 0.9 % (ref 0–1.8)
BASOPHILS # BLD: 0.07 K/UL (ref 0–0.12)
BILIRUB SERPL-MCNC: 0.7 MG/DL (ref 0.1–1.5)
BUN SERPL-MCNC: 14 MG/DL (ref 8–22)
CALCIUM SERPL-MCNC: 9.5 MG/DL (ref 8.5–10.5)
CHLORIDE SERPL-SCNC: 106 MMOL/L (ref 96–112)
CHOLEST SERPL-MCNC: 195 MG/DL (ref 100–199)
CO2 SERPL-SCNC: 26 MMOL/L (ref 20–33)
CREAT SERPL-MCNC: 0.78 MG/DL (ref 0.5–1.4)
EOSINOPHIL # BLD AUTO: 0.18 K/UL (ref 0–0.51)
EOSINOPHIL NFR BLD: 2.2 % (ref 0–6.9)
ERYTHROCYTE [DISTWIDTH] IN BLOOD BY AUTOMATED COUNT: 45.4 FL (ref 35.9–50)
FASTING STATUS PATIENT QL REPORTED: NORMAL
GLOBULIN SER CALC-MCNC: 3.6 G/DL (ref 1.9–3.5)
GLUCOSE SERPL-MCNC: 85 MG/DL (ref 65–99)
HCT VFR BLD AUTO: 43.9 % (ref 37–47)
HDLC SERPL-MCNC: 91 MG/DL
HGB BLD-MCNC: 13.9 G/DL (ref 12–16)
IMM GRANULOCYTES # BLD AUTO: 0.03 K/UL (ref 0–0.11)
IMM GRANULOCYTES NFR BLD AUTO: 0.4 % (ref 0–0.9)
LDLC SERPL CALC-MCNC: 90 MG/DL
LIPASE SERPL-CCNC: 52 U/L (ref 11–82)
LYMPHOCYTES # BLD AUTO: 1.9 K/UL (ref 1–4.8)
LYMPHOCYTES NFR BLD: 23.4 % (ref 22–41)
MCH RBC QN AUTO: 27.9 PG (ref 27–33)
MCHC RBC AUTO-ENTMCNC: 31.7 G/DL (ref 33.6–35)
MCV RBC AUTO: 88 FL (ref 81.4–97.8)
MONOCYTES # BLD AUTO: 0.64 K/UL (ref 0–0.85)
MONOCYTES NFR BLD AUTO: 7.9 % (ref 0–13.4)
NEUTROPHILS # BLD AUTO: 5.29 K/UL (ref 2–7.15)
NEUTROPHILS NFR BLD: 65.2 % (ref 44–72)
NRBC # BLD AUTO: 0 K/UL
NRBC BLD-RTO: 0 /100 WBC
PLATELET # BLD AUTO: 234 K/UL (ref 164–446)
PMV BLD AUTO: 11.1 FL (ref 9–12.9)
POTASSIUM SERPL-SCNC: 4.6 MMOL/L (ref 3.6–5.5)
PROT SERPL-MCNC: 8.1 G/DL (ref 6–8.2)
RBC # BLD AUTO: 4.99 M/UL (ref 4.2–5.4)
SODIUM SERPL-SCNC: 140 MMOL/L (ref 135–145)
TRIGL SERPL-MCNC: 72 MG/DL (ref 0–149)
WBC # BLD AUTO: 8.1 K/UL (ref 4.8–10.8)

## 2019-09-05 PROCEDURE — 36415 COLL VENOUS BLD VENIPUNCTURE: CPT

## 2019-09-05 PROCEDURE — 83690 ASSAY OF LIPASE: CPT

## 2019-09-05 PROCEDURE — 85025 COMPLETE CBC W/AUTO DIFF WBC: CPT

## 2019-09-05 PROCEDURE — 82785 ASSAY OF IGE: CPT

## 2019-09-05 PROCEDURE — 82150 ASSAY OF AMYLASE: CPT

## 2019-09-05 PROCEDURE — 80061 LIPID PANEL: CPT

## 2019-09-05 PROCEDURE — 99214 OFFICE O/P EST MOD 30 MIN: CPT | Performed by: PHYSICIAN ASSISTANT

## 2019-09-05 PROCEDURE — 86003 ALLG SPEC IGE CRUDE XTRC EA: CPT | Mod: 91

## 2019-09-05 PROCEDURE — 83516 IMMUNOASSAY NONANTIBODY: CPT

## 2019-09-05 PROCEDURE — 80053 COMPREHEN METABOLIC PANEL: CPT

## 2019-09-05 ASSESSMENT — ENCOUNTER SYMPTOMS
ROS SKIN COMMENTS: LACERATION OF LEFT HAND
PALPITATIONS: 0
SHORTNESS OF BREATH: 0
COUGH: 0
FEVER: 0

## 2019-09-05 NOTE — PROGRESS NOTES
"Subjective:      Makeda Arguello is a 24 y.o. female who presents with Hand Injury (WC [facility] DOI-9/1/19- (L) is feeling better,gets a little sore when using hand)        HPI    DOI 9/1/19 at 2330: Visit #2.  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 This was repaired with 2 sutures.  She states she has minimal pain.  Denies any swelling or discharge from the area.    Review of Systems   Constitutional: Negative for fever and malaise/fatigue.   Respiratory: Negative for cough and shortness of breath.    Cardiovascular: Negative for chest pain and palpitations.   Skin:        Laceration of left hand     All other systems reviewed and are negative.    PMH:  has no past medical history on file.  MEDS:   Current Outpatient Medications:   •  omeprazole (PRILOSEC) 40 MG delayed-release capsule, Take 1 Cap by mouth every day., Disp: 30 Cap, Rfl: 1  •  Ibuprofen (ADVIL PO), Take  by mouth., Disp: , Rfl:   •  acetaminophen (TYLENOL) 325 MG Tab, Take 650 mg by mouth every four hours as needed., Disp: , Rfl:   ALLERGIES: No Known Allergies  SURGHX: History reviewed. No pertinent surgical history.  SOCHX:  reports that she has never smoked. She has never used smokeless tobacco. She reports that she has current or past drug history. Drug: Marijuana. She reports that she does not drink alcohol.  FH: Family history was reviewed, no pertinent findings to report  Medications, Allergies, and current problem list reviewed today in Epic       Objective:     Blood Pressure 110/72 (BP Location: Left arm)   Pulse 64   Temperature 36.5 °C (97.7 °F) (Temporal)   Respiration 16   Height 1.499 m (4' 11\")   Weight 55.8 kg (123 lb)   Last Menstrual Period 09/01/2019 (Exact Date)   Oxygen Saturation 97%   Body Mass Index 24.84 kg/m²      Physical Exam    Constitutional: Pt is oriented to person, place, and time.  Appears well-developed and " well-nourished. No distress.   HENT:   Mouth/Throat: Oropharynx is clear and moist.   Eyes: Conjunctivae are normal.   Cardiovascular: Normal rate.    Pulmonary/Chest: Effort normal.   Musculoskeletal: Full ROM of left hand/fingers.  Neurological: Pt is alert and oriented to person, place, and time. Coordination normal.   Skin: Well healing laceration of th left thenar eminence.  Sutures intact with no sign of infection.  Psychiatric: Pt has a normal mood and affect.  Behavior is normal.          Assessment/Plan:     1. Laceration of left hand without foreign body, subsequent encounter  - D39  - Follow up in 4 days for suture removal, anticipate discharge

## 2019-09-05 NOTE — LETTER
[facility]  [address] [address]  Phone:  [phone] - Fax:  [phone]   [facility] Progress Report and Disability Certification  Date of Service: 9/5/2019   No Show:  No  Date / Time of Next Visit: 9/8/2019@10:40am   Claim Information   Patient Name: Makeda Arguello  Claim Number:     Employer: [employer]  Date of Injury: 9/1/2019     Insurer / TPA: [employer]  ID / SSN:     Occupation: Cook  Diagnosis: The encounter diagnosis was Laceration of left hand without foreign body, subsequent encounter.    Medical Information   Related to Industrial Injury? Yes    Subjective Complaints:  DOI 9/1/19 at 2330: Visit #2.  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 This was repaired with 2 sutures.  She states she has minimal pain.  Denies any swelling or discharge from the area.   Objective Findings: Constitutional: Pt is oriented to person, place, and time.  Appears well-developed and well-nourished. No distress.   HENT:   Mouth/Throat: Oropharynx is clear and moist.   Eyes: Conjunctivae are normal.   Cardiovascular: Normal rate.    Pulmonary/Chest: Effort normal.   Musculoskeletal: Full ROM of left hand/fingers.  Neurological: Pt is alert and oriented to person, place, and time. Coordination normal.   Skin: Well healing laceration of th left thenar eminence.  Sutures intact with no sign of infection.  Psychiatric: Pt has a normal mood and affect.  Behavior is normal.      Pre-Existing Condition(s):     Assessment:   Condition Improved    Status: Additional Care Required  Permanent Disability:No    Plan:      Diagnostics:      Comments:       Disability Information   Status: Released to Restricted Duty    From:  9/5/2019  Through: 9/8/2019 Restrictions are: Temporary   Physical Restrictions   Sitting:    Standing:    Stooping:   Bending:      Squatting:    Walking:    Climbing:    Pushing:      Pulling:    Other:    Reaching Above Shoulder (L):   Reaching Above Shoulder (R):       Reaching Below Shoulder (L):    Reaching Below Shoulder (R):      Not to exceed Weight Limits   Carrying(hrs):   Weight Limit(lb):   Lifting(hrs):   Weight  Limit(lb):     Comments: Avoid use of left hand.  Keep wound covered at work at all times.    Repetitive Actions   Hands: i.e. Fine Manipulations from Grasping:     Feet: i.e. Operating Foot Controls:     Driving / Operate Machinery:     Physician Name: Andi Torres P.A.-C. Physician Signature: ANDI Avelar P.A.-C. e-Signature: Dr. Aliiro Mora, Medical Director   Clinic Name / Location: [address] [address] Clinic Phone Number: Dept: [phone]   Appointment Time: 8:30 Am Visit Start Time: 8:37 AM   Check-In Time:  8:33 Am Visit Discharge Time:  9:06am   Original-Treating Physician or Chiropractor    Page 2-Insurer/TPA    Page 3-Employer    Page 4-Employee

## 2019-09-07 LAB
ALMOND IGE QN: 0.11 KU/L
AVOCADO IGE QN: 0.49 KU/L
BANANA IGE QN: 0.36 KU/L
CELERY IGE QN: <0.1 KU/L
CHESTNUT IGE QN: <0.1 KU/L
COCONUT IGE QN: 0.1 KU/L
COW MILK IGE QN: 0.42 KU/L
DEPRECATED MISC ALLERGEN IGE RAST QL: ABNORMAL
EGG WHITE IGE QN: 1.12 KU/L
GLIADIN IGA SER IA-ACNC: 4 UNITS (ref 0–19)
GLIADIN IGG SER IA-ACNC: 3 UNITS (ref 0–19)
GRAPE IGE QN: 0.24 KU/L
IGE SERPL-ACNC: 76 KU/L
KIWIFRUIT IGE QN: 0.34 KU/L
OAT IGE QN: <0.1 KU/L
PAPAYA IGE QN: 0.1 KU/L
PEANUT IGE QN: 0.45 KU/L
PECAN/HICK NUT IGE QN: <0.1 KU/L
POTATO IGE QN: <0.1 KU/L
SESAME SEED IGE QN: 0.12 KU/L
SOYBEAN IGE QN: <0.1 KU/L
TOMATO IGE QN: <0.1 KU/L
TTG IGA SER IA-ACNC: 1 U/ML (ref 0–3)
TTG IGG SER IA-ACNC: 1 U/ML (ref 0–5)
WATERMELON IGE QN: <0.1 KU/L
WHEAT IGE QN: 0.16 KU/L

## 2019-09-08 ENCOUNTER — OCCUPATIONAL MEDICINE (OUTPATIENT)
Dept: URGENT CARE | Facility: CLINIC | Age: 24
End: 2019-09-08
Payer: COMMERCIAL

## 2019-09-08 VITALS
WEIGHT: 121 LBS | HEART RATE: 72 BPM | RESPIRATION RATE: 16 BRPM | OXYGEN SATURATION: 97 % | HEIGHT: 59 IN | TEMPERATURE: 97.3 F | DIASTOLIC BLOOD PRESSURE: 70 MMHG | BODY MASS INDEX: 24.39 KG/M2 | SYSTOLIC BLOOD PRESSURE: 112 MMHG

## 2019-09-08 DIAGNOSIS — S61.412D LACERATION OF LEFT HAND WITHOUT FOREIGN BODY, SUBSEQUENT ENCOUNTER: ICD-10-CM

## 2019-09-08 DIAGNOSIS — Z48.02 ENCOUNTER FOR REMOVAL OF SUTURES: ICD-10-CM

## 2019-09-08 PROCEDURE — 99213 OFFICE O/P EST LOW 20 MIN: CPT | Performed by: NURSE PRACTITIONER

## 2019-09-08 NOTE — LETTER
[facility]  [address] [address]  Phone:  [phone] - Fax:  [phone]   [facility] Progress Report and Disability Certification  Date of Service: 9/8/2019   No Show:  No  Date / Time of Next Visit: 9/11/2019 @ 10 AM   Claim Information   Patient Name: Makeda Roman  Claim Number:     Employer: [employer]  Date of Injury: 9/1/2019     Insurer / TPA: [employer]  ID / SSN:     Occupation: Cook  Diagnosis: The encounter diagnosis was Laceration of left hand without foreign body, subsequent encounter.    Medical Information   Related to Industrial Injury? Yes    Subjective Complaints:  DOI 9/1/19. 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 Laceration was repaired with 2 sutures during initial visit.    Follow up today: Laceration site to left hand is healing without complication, 2 intact sutures. No redness, drainage or warmth to area. Denies swelling to area.  Does occasionally have a sharp pain that radiates up her arm from the thumb. Occurs 2-3 times a day, and doesn't last. Last occurrence was last night. No pain at suture site. No Numbness in hand or difficulty with ROM.    Objective Findings: Constitutional: She is oriented to person, place, and time.    Pulses: Radial pulses are 2+ on the left side.   Musculoskeletal:        Left hand: She exhibits tenderness. She exhibits normal range of motion, no bony tenderness, normal capillary refill and no deformity. Normal sensation noted. Normal strength noted. She exhibits no thumb/finger opposition.   Mild bruising to dorsal hand over 1st digit, slight tenderness to palpation over area. Has full ROM in left hand and wrist. No reported pain with ROM.    Neurological: She is alert and oriented to person, place, and time. She has normal strength. No sensory  deficit.   Skin: No erythema.   Two intact sutures to left thenar eminence, edges well approximated. No tenderness to palpation, erythema, swelling, warmth, or drainage to repaired site. Two sutures removed without complication, pt tolerated procedure well.        Pre-Existing Condition(s):     Assessment:   Condition Improved    Status: Additional Care Required  Permanent Disability:No    Plan: Medication    Diagnostics:      Comments:       Disability Information   Status: Released to Restricted Duty    From:  9/8/2019  Through: 9/11/2019 Restrictions are: Temporary   Physical Restrictions   Sitting:    Standing:    Stooping:    Bending:      Squatting:    Walking:    Climbing:    Pushing:      Pulling:    Other:    Reaching Above Shoulder (L):   Reaching Above Shoulder (R):       Reaching Below Shoulder (L):    Reaching Below Shoulder (R):      Not to exceed Weight Limits   Carrying(hrs):   Weight Limit(lb):   Lifting(hrs):   Weight  Limit(lb):     Comments: -Monitor for signs of infection, and swelling to hand.   -RICE therapy: rest, ice, elevate hand.   -Take OTC ibuprofen as directed for inflammation.  -Follow up in 3 days, to re-evaluate intermittent pain.     Repetitive Actions   Hands: i.e. Fine Manipulations from Grasping: < or = to 1 hr/day  Comments:Left Hand   Feet: i.e. Operating Foot Controls:     Driving / Operate Machinery:     Physician Name: SHAHID Helm Physician Signature: VENKATA Varela e-Signature: Dr. Alirio Mora, Medical Director   Clinic Name / Location: [address] [address] Clinic Phone Number: Dept: [phone]   Appointment Time: 10:45 Am Visit Start Time: 10:43 AM   Check-In Time:  10:40 Am Visit Discharge Time:  12:07 PM   Original-Treating Physician or Chiropractor    Page 2-Insurer/TPA    Page 3-Employer    Page 4-Employee

## 2019-09-08 NOTE — PATIENT INSTRUCTIONS
Suture Removal, Care After  Refer to this sheet in the next few weeks. These instructions provide you with information on caring for yourself after your procedure. Your health care provider may also give you more specific instructions. Your treatment has been planned according to current medical practices, but problems sometimes occur. Call your health care provider if you have any problems or questions after your procedure.  WHAT TO EXPECT AFTER THE PROCEDURE  After your stitches (sutures) are removed, it is typical to have the following:  · Some discomfort and swelling in the wound area.  · Slight redness in the area.  HOME CARE INSTRUCTIONS   · If you have skin adhesive strips over the wound area, do not take the strips off. They will fall off on their own in a few days. If the strips remain in place after 14 days, you may remove them.  · Change any bandages (dressings) at least once a day or as directed by your health care provider. If the bandage sticks, soak it off with warm, soapy water.  · Apply cream or ointment only as directed by your health care provider. If using cream or ointment, wash the area with soap and water 2 times a day to remove all the cream or ointment. Rinse off the soap and pat the area dry with a clean towel.  · Keep the wound area dry and clean. If the bandage becomes wet or dirty, or if it develops a bad smell, change it as soon as possible.  · Continue to protect the wound from injury.  · Use sunscreen when out in the sun. New scars become sunburned easily.  SEEK MEDICAL CARE IF:  · You have increasing redness, swelling, or pain in the wound.  · You see pus coming from the wound.  · You have a fever.  · You notice a bad smell coming from the wound or dressing.  · Your wound breaks open (edges not staying together).     This information is not intended to replace advice given to you by your health care provider. Make sure you discuss any questions you have with your health care  provider.     Document Released: 09/12/2002 Document Revised: 10/08/2014 Document Reviewed: 07/30/2014  [employer] Interactive Patient Education ©2016 [employer]

## 2019-09-08 NOTE — PROGRESS NOTES
"Subjective:      Makeda Roman is a 24 y.o. female who presents with Work-Related Injury (W/C FV DOI 9/1/19, (L) hand injury. 2 stiches placed. sharp pain that radiates up arm)            DOI 9/1/19. 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 Laceration was repaired with 2 sutures during initial visit.    Follow up today: Laceration site to left hand is healing without complication, 2 intact sutures. No redness, drainage or warmth to area. Denies swelling to area.  Does occasionally have a sharp pain that radiates up her arm from the thumb. Occurs 2-3 times a day, and doesn't last. Last occurrence was last night. No pain at suture site. No Numbness in hand or difficulty with ROM.          Review of Systems   Constitutional: Negative for chills and fever.   Musculoskeletal:        No ROM deficit in hand/thumb.   Skin:        Intact sutures to hand, no redness.   All other systems reviewed and are negative.         Objective:     /70   Pulse 72   Temp 36.3 °C (97.3 °F) (Temporal)   Resp 16   Ht 1.499 m (4' 11\")   Wt 54.9 kg (121 lb)   LMP 09/01/2019 (Exact Date)   SpO2 97%   BMI 24.44 kg/m²      Physical Exam   Constitutional: She is oriented to person, place, and time. She appears well-developed. No distress.   HENT:   Head: Normocephalic.   Right Ear: External ear normal.   Left Ear: External ear normal.   Nose: Nose normal.   Mouth/Throat: Oropharynx is clear and moist.   Eyes: Conjunctivae are normal.   Neck: Normal range of motion.   Cardiovascular: Normal rate.   Pulses:       Radial pulses are 2+ on the left side.   Pulmonary/Chest: Effort normal.   Musculoskeletal: Normal range of motion. She exhibits tenderness. She exhibits no deformity.        Left hand: She exhibits tenderness. She exhibits normal range of motion, no bony tenderness, normal capillary refill and no deformity. Normal " sensation noted. Normal strength noted. She exhibits no thumb/finger opposition.   Mild bruising to dorsal hand over 1st digit, slight tenderness to palpation over area. Has full ROM in left hand and wrist. No reported pain with ROM.    Neurological: She is alert and oriented to person, place, and time. She has normal strength. No sensory deficit.   Skin: Skin is warm and dry. No erythema.   Two intact sutures to left thenar eminence, edges well approximated. No tenderness to palpation, erythema, swelling, warmth, or drainage to repaired site. Two sutures removed without complication, pt tolerated procedure well.     Psychiatric: She has a normal mood and affect. Her behavior is normal. Judgment and thought content normal.   Vitals reviewed.              Assessment/Plan:     1. Laceration of left hand without foreign body, subsequent encounter  Note D-39.    -Monitor for signs of infection, and swelling to hand.   -RICE therapy: rest, ice, elevate hand.   -Take OTC ibuprofen as directed for inflammation.  -Follow up in 3 days, to re-evaluate intermittent pain.

## 2019-09-10 ENCOUNTER — NON-PROVIDER VISIT (OUTPATIENT)
Dept: OCCUPATIONAL MEDICINE | Facility: CLINIC | Age: 24
End: 2019-09-10
Payer: COMMERCIAL

## 2019-09-10 ENCOUNTER — TELEPHONE (OUTPATIENT)
Dept: OCCUPATIONAL MEDICINE | Facility: CLINIC | Age: 24
End: 2019-09-10

## 2019-09-10 DIAGNOSIS — Z02.83 ENCOUNTER FOR DRUG SCREENING: ICD-10-CM

## 2019-09-10 PROCEDURE — 8911 PR MRO FEE: Performed by: PREVENTIVE MEDICINE

## 2019-09-10 ASSESSMENT — ENCOUNTER SYMPTOMS
CHILLS: 0
FEVER: 0

## 2019-09-11 ENCOUNTER — OCCUPATIONAL MEDICINE (OUTPATIENT)
Dept: OCCUPATIONAL MEDICINE | Facility: CLINIC | Age: 24
End: 2019-09-11
Payer: COMMERCIAL

## 2019-09-11 VITALS
OXYGEN SATURATION: 98 % | SYSTOLIC BLOOD PRESSURE: 118 MMHG | HEART RATE: 67 BPM | TEMPERATURE: 97.6 F | DIASTOLIC BLOOD PRESSURE: 90 MMHG | BODY MASS INDEX: 24.44 KG/M2 | WEIGHT: 121 LBS

## 2019-09-11 DIAGNOSIS — S61.412D LACERATION OF LEFT HAND WITHOUT FOREIGN BODY, SUBSEQUENT ENCOUNTER: ICD-10-CM

## 2019-09-11 PROCEDURE — 99213 OFFICE O/P EST LOW 20 MIN: CPT | Performed by: NURSE PRACTITIONER

## 2019-09-11 ASSESSMENT — ENCOUNTER SYMPTOMS
PSYCHIATRIC NEGATIVE: 1
NEUROLOGICAL NEGATIVE: 1
MUSCULOSKELETAL NEGATIVE: 1
CONSTITUTIONAL NEGATIVE: 1
RESPIRATORY NEGATIVE: 1
CARDIOVASCULAR NEGATIVE: 1

## 2019-09-11 NOTE — LETTER
[address],   [address] [address]  Phone:  [phone] - Fax:  [phone]   [facility] Progress Report and Disability Certification  Date of Service: 9/11/2019   No Show:  No  Date / Time of Next Visit:  Discharged/MMI Released to Full Duty   Claim Information   Patient Name: Makeda Roman  Claim Number:     Employer: [employer]  Date of Injury: 9/1/2019     Insurer / TPA: [employer]  ID / SSN:     Occupation: Cook  Diagnosis: The encounter diagnosis was Laceration of left hand without foreign body, subsequent encounter.    Medical Information   Related to Industrial Injury? Yes    Subjective Complaints:  DOI 9/1/19. 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 Laceration was repaired with 2 sutures during initial visit and 2 sutures were removed at the last visit. Patient states that overall all much improved. Hand wound has closed. No redness, discharge, and warmth. Plan of care discussed.    Objective Findings: Left hand:  Laceration site to left hand is healing without complication  Neg erythema, edema, discharge, or warmth  FROM without pain  Neg tenderness or pain to touch   Pre-Existing Condition(s):     Assessment:   Condition Improved    Status: Discharged /  MMI  Permanent Disability:No    Plan:      Diagnostics:      Comments:  Discharged/MMI  Released to Full Duty      Disability Information   Status: Released to Full Duty    From:     Through:   Restrictions are:     Physical Restrictions   Sitting:    Standing:    Stooping:    Bending:      Squatting:    Walking:    Climbing:    Pushing:      Pulling:    Other:    Reaching Above Shoulder (L):   Reaching Above Shoulder (R):       Reaching Below Shoulder (L):    Reaching Below Shoulder (R):      Not to exceed Weight Limits      Carrying(hrs):   Weight Limit(lb):   Lifting(hrs):   Weight  Limit(lb):     Comments:      Repetitive Actions   Hands: i.e. Fine Manipulations from Grasping:     Feet: i.e. Operating Foot Controls:     Driving / Operate Machinery:     Physician Name: SHAHID Light Physician Signature: MICA Degroot e-Signature: Dr. Alirio Mora, Medical Director   Clinic Name / Location: [address],   [address] [place] [address] Clinic Phone Number: Dept: [phone]   Appointment Time: 10:00 Am Visit Start Time: 9:54 AM   Check-In Time:  9:50 Am Visit Discharge Time:  10:12am   Original-Treating Physician or Chiropractor    Page 2-Insurer/TPA    Page 3-Employer    Page 4-Employee

## 2019-11-04 ENCOUNTER — APPOINTMENT (OUTPATIENT)
Dept: RADIOLOGY | Facility: IMAGING CENTER | Age: 24
End: 2019-11-04
Attending: NURSE PRACTITIONER
Payer: COMMERCIAL

## 2019-11-04 ENCOUNTER — OFFICE VISIT (OUTPATIENT)
Dept: URGENT CARE | Facility: PHYSICIAN GROUP | Age: 24
End: 2019-11-04
Payer: COMMERCIAL

## 2019-11-04 VITALS
RESPIRATION RATE: 16 BRPM | SYSTOLIC BLOOD PRESSURE: 118 MMHG | DIASTOLIC BLOOD PRESSURE: 80 MMHG | OXYGEN SATURATION: 99 % | HEART RATE: 82 BPM | BODY MASS INDEX: 24.19 KG/M2 | WEIGHT: 120 LBS | HEIGHT: 59 IN | TEMPERATURE: 98.3 F

## 2019-11-04 DIAGNOSIS — M25.572 ACUTE BILATERAL ANKLE PAIN: ICD-10-CM

## 2019-11-04 DIAGNOSIS — M25.512 ACUTE PAIN OF BOTH SHOULDERS: ICD-10-CM

## 2019-11-04 DIAGNOSIS — M25.562 ACUTE PAIN OF BOTH KNEES: ICD-10-CM

## 2019-11-04 DIAGNOSIS — M25.561 ACUTE PAIN OF BOTH KNEES: ICD-10-CM

## 2019-11-04 DIAGNOSIS — R07.81 RIB PAIN ON RIGHT SIDE: ICD-10-CM

## 2019-11-04 DIAGNOSIS — M25.571 ACUTE BILATERAL ANKLE PAIN: ICD-10-CM
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
## 2019-11-04 DIAGNOSIS — M25.511 ACUTE PAIN OF BOTH SHOULDERS: ICD-10-CM

## 2019-11-04 DIAGNOSIS — M54.50 ACUTE LEFT-SIDED LOW BACK PAIN WITHOUT SCIATICA: ICD-10-CM

## 2019-11-04 DIAGNOSIS — M54.2 NECK PAIN: ICD-10-CM

## 2019-11-04 PROCEDURE — 72040 X-RAY EXAM NECK SPINE 2-3 VW: CPT | Mod: TC | Performed by: NURSE PRACTITIONER

## 2019-11-04 PROCEDURE — 71111 X-RAY EXAM RIBS/CHEST4/> VWS: CPT | Mod: TC | Performed by: NURSE PRACTITIONER

## 2019-11-04 PROCEDURE — 73610 X-RAY EXAM OF ANKLE: CPT | Mod: TC,RT | Performed by: NURSE PRACTITIONER

## 2019-11-04 PROCEDURE — 99214 OFFICE O/P EST MOD 30 MIN: CPT | Performed by: NURSE PRACTITIONER

## 2019-11-04 PROCEDURE — 73564 X-RAY EXAM KNEE 4 OR MORE: CPT | Mod: 26,LT | Performed by: NURSE PRACTITIONER

## 2019-11-04 RX ORDER — CYCLOBENZAPRINE HCL 5 MG
5 TABLET ORAL 2 TIMES DAILY PRN
Qty: 30 TAB | Refills: 0 | Status: SHIPPED | OUTPATIENT
Start: 2019-11-04

## 2019-11-04 ASSESSMENT — ENCOUNTER SYMPTOMS
SORE THROAT: 0
DIARRHEA: 0
NECK PAIN: 1
BACK PAIN: 1
SPEECH CHANGE: 0
SINUS PAIN: 0
HEADACHES: 0
CHILLS: 0
LOSS OF CONSCIOUSNESS: 0
DOUBLE VISION: 0
CONSTIPATION: 0
NAUSEA: 0
BLURRED VISION: 0
ABDOMINAL PAIN: 0
SENSORY CHANGE: 0
VOMITING: 0
COUGH: 0
PHOTOPHOBIA: 0
DIZZINESS: 0
WHEEZING: 0
FEVER: 0
SEIZURES: 0
WEAKNESS: 0
SHORTNESS OF BREATH: 0
PSYCHIATRIC NEGATIVE: 1

## 2019-11-04 NOTE — LETTER
November 4, 2019         Patient: Makeda Roman   YOB: 1995   Date of Visit: 11/4/2019           To Whom it May Concern:    Makeda Roman was seen in my clinic on 11/4/2019. Please excuse her from work 11/4/2019 through 11/6/2019. She may return on 11/7/2019.    If you have any questions or concerns, please don't hesitate to call.        Sincerely,           KELLI Shanks.  Electronically Signed
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
## 2019-11-28 ENCOUNTER — APPOINTMENT (OUTPATIENT)
Dept: RADIOLOGY | Facility: MEDICAL CENTER | Age: 24
End: 2019-11-28
Attending: EMERGENCY MEDICINE
Payer: COMMERCIAL

## 2019-11-28 ENCOUNTER — HOSPITAL ENCOUNTER (EMERGENCY)
Facility: MEDICAL CENTER | Age: 24
End: 2019-11-28
Attending: EMERGENCY MEDICINE
Payer: COMMERCIAL

## 2019-11-28 VITALS
BODY MASS INDEX: 25.6 KG/M2 | DIASTOLIC BLOOD PRESSURE: 75 MMHG | OXYGEN SATURATION: 100 % | HEIGHT: 59 IN | RESPIRATION RATE: 17 BRPM | SYSTOLIC BLOOD PRESSURE: 115 MMHG | HEART RATE: 87 BPM | WEIGHT: 127 LBS

## 2019-11-28 DIAGNOSIS — S62.91XA: ICD-10-CM

## 2019-11-28 PROCEDURE — 73100 X-RAY EXAM OF WRIST: CPT | Mod: XU,RT

## 2019-11-28 PROCEDURE — 25636 HCHG MISC ORTHO ITEM RC 0274: CPT

## 2019-11-28 PROCEDURE — 700102 HCHG RX REV CODE 250 W/ 637 OVERRIDE(OP): Performed by: EMERGENCY MEDICINE

## 2019-11-28 PROCEDURE — 96375 TX/PRO/DX INJ NEW DRUG ADDON: CPT | Mod: XU

## 2019-11-28 PROCEDURE — 25635 CLTX CARPL FX W/MNPJ EA B1: CPT

## 2019-11-28 PROCEDURE — A9270 NON-COVERED ITEM OR SERVICE: HCPCS | Performed by: EMERGENCY MEDICINE

## 2019-11-28 PROCEDURE — 94770 HCHG CO2 EXPIRED GAS DETERMINATION: CPT | Mod: XU

## 2019-11-28 PROCEDURE — 73110 X-RAY EXAM OF WRIST: CPT | Mod: RT

## 2019-11-28 PROCEDURE — 25605 CLTX DST RDL FX/EPHYS SEP W/: CPT

## 2019-11-28 PROCEDURE — 96374 THER/PROPH/DIAG INJ IV PUSH: CPT | Mod: XU

## 2019-11-28 PROCEDURE — 306637 HCHG MISC ORTHO ITEM RC 0274

## 2019-11-28 PROCEDURE — 99285 EMERGENCY DEPT VISIT HI MDM: CPT

## 2019-11-28 PROCEDURE — 71046 X-RAY EXAM CHEST 2 VIEWS: CPT

## 2019-11-28 PROCEDURE — 302875 HCHG BANDAGE ACE 4 OR 6""

## 2019-11-28 PROCEDURE — 73200 CT UPPER EXTREMITY W/O DYE: CPT | Mod: RT

## 2019-11-28 PROCEDURE — 700111 HCHG RX REV CODE 636 W/ 250 OVERRIDE (IP): Performed by: EMERGENCY MEDICINE

## 2019-11-28 RX ORDER — HYDROCODONE BITARTRATE AND ACETAMINOPHEN 5; 325 MG/1; MG/1
1 TABLET ORAL EVERY 6 HOURS PRN
Qty: 12 TAB | Refills: 0 | Status: SHIPPED | OUTPATIENT
Start: 2019-11-28 | End: 2019-12-01

## 2019-11-28 RX ORDER — HYDROCODONE BITARTRATE AND ACETAMINOPHEN 5; 325 MG/1; MG/1
1 TABLET ORAL ONCE
Status: COMPLETED | OUTPATIENT
Start: 2019-11-28 | End: 2019-11-28

## 2019-11-28 RX ORDER — IBUPROFEN 600 MG/1
600 TABLET ORAL ONCE
Status: DISCONTINUED | OUTPATIENT
Start: 2019-11-28 | End: 2019-11-28

## 2019-11-28 RX ADMIN — HYDROCODONE BITARTRATE AND ACETAMINOPHEN 1 TABLET: 5; 325 TABLET ORAL at 19:12

## 2019-11-28 RX ADMIN — PROPOFOL 130 MG: 10 INJECTION, EMULSION INTRAVENOUS at 20:41

## 2019-11-28 RX ADMIN — FENTANYL CITRATE 50 MCG: 0.05 INJECTION, SOLUTION INTRAMUSCULAR; INTRAVENOUS at 20:31

## 2019-11-28 RX ADMIN — HYDROCODONE BITARTRATE AND ACETAMINOPHEN 1 TABLET: 5; 325 TABLET ORAL at 22:12
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
## 2019-11-29 NOTE — ED NOTES
Patient call light on, she states she has numbness and tingling in her fingers on R hand after cast placement. DR Lombardi notified, is going to update patient after speaking with ortho.  Patient has WDL circulation and vascular checks

## 2019-11-29 NOTE — ED NOTES
Dr Lombardi at bedside to re-check patient's circulation and cast bc of her complaints of tingling.  Patient's cast rewrapped, patient states her fingers feel better.  Patient's cap refill and circulation has remained normal.
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
## 2019-11-29 NOTE — ED NOTES
Patient given 1 RX, educated on use and administration. Educated on narcotic use. Patient given return precautions and follow up information.  Ambulated to lobby with steady gait, mother and family member to drive her home

## 2023-01-20 ENCOUNTER — OFFICE VISIT (OUTPATIENT)
Dept: URGENT CARE | Facility: CLINIC | Age: 28
End: 2023-01-20
Payer: COMMERCIAL

## 2023-01-20 VITALS
HEART RATE: 81 BPM | SYSTOLIC BLOOD PRESSURE: 116 MMHG | HEIGHT: 59 IN | BODY MASS INDEX: 27.01 KG/M2 | DIASTOLIC BLOOD PRESSURE: 78 MMHG | WEIGHT: 134 LBS | OXYGEN SATURATION: 98 % | TEMPERATURE: 97 F | RESPIRATION RATE: 18 BRPM

## 2023-01-20 DIAGNOSIS — J06.9 UPPER RESPIRATORY TRACT INFECTION, UNSPECIFIED TYPE: ICD-10-CM

## 2023-01-20 DIAGNOSIS — Z34.90 PREGNANCY, UNSPECIFIED GESTATIONAL AGE: ICD-10-CM

## 2023-01-20 DIAGNOSIS — R11.2 NAUSEA AND VOMITING, UNSPECIFIED VOMITING TYPE: ICD-10-CM

## 2023-01-20 LAB
APPEARANCE UR: NORMAL
BILIRUB UR STRIP-MCNC: NEGATIVE MG/DL
COLOR UR AUTO: NORMAL
GLUCOSE UR STRIP.AUTO-MCNC: NEGATIVE MG/DL
INT CON NEG: NORMAL
INT CON POS: NORMAL
KETONES UR STRIP.AUTO-MCNC: 15 MG/DL
LEUKOCYTE ESTERASE UR QL STRIP.AUTO: NORMAL
NITRITE UR QL STRIP.AUTO: POSITIVE
PH UR STRIP.AUTO: 5 [PH] (ref 5–8)
POC URINE PREGNANCY TEST: POSITIVE
PROT UR QL STRIP: 30 MG/DL
RBC UR QL AUTO: NORMAL
SP GR UR STRIP.AUTO: 1.02
UROBILINOGEN UR STRIP-MCNC: 0.2 MG/DL

## 2023-01-20 PROCEDURE — 99203 OFFICE O/P NEW LOW 30 MIN: CPT | Performed by: PHYSICIAN ASSISTANT

## 2023-01-20 PROCEDURE — 81025 URINE PREGNANCY TEST: CPT | Performed by: PHYSICIAN ASSISTANT

## 2023-01-20 PROCEDURE — 81002 URINALYSIS NONAUTO W/O SCOPE: CPT | Performed by: PHYSICIAN ASSISTANT

## 2023-01-20 ASSESSMENT — ENCOUNTER SYMPTOMS: COUGH: 1

## 2023-01-21 NOTE — PROGRESS NOTES
"Subjective:   Makeda Roman is a 27 y.o. female who presents for Cough (X 5 days, vomiting, nausea, cough, sore throat, congestion, headache, body aches, chill)     This is a pleasant 27-year-old female who presents with chief complaint of St, cough, myalgias, nasal congestion and ha since Sunday night  No fevers  No sob  No covid concerns, has not taken any home COVID test    She also reports nausea and vomiting x 2 days, associated with eating, 3-4 episodes/day  Able to keep fluids down without difficulty.   She denies abdominal pain, diarrhea, pelvic cramping, vaginal bleeding     LMP approximately January 1, 2023, lasted 4 days, normal duration per patient  She has had unprotected sex, denies concern for STI          Review of Systems   Respiratory:  Positive for cough.      Medications:  acetaminophen Tabs  ADVIL PO  cyclobenzaprine    Allergies:             Patient has no known allergies.    Surgical History:       No past surgical history on file.    Past Social Hx:  Makeda Roman  reports that she has never smoked. She has never used smokeless tobacco. She reports that she does not currently use alcohol. She reports that she does not currently use drugs after having used the following drugs: Marijuana.     Past Family Hx:   Makeda Roman family history is not on file.       Problem list, medications, and allergies reviewed by myself today in Epic.     Objective:     /78   Pulse 81   Temp 36.1 °C (97 °F) (Temporal)   Resp 18   Ht 1.499 m (4' 11\")   Wt 60.8 kg (134 lb)   LMP 12/30/2022 (Approximate)   SpO2 98%   BMI 27.06 kg/m²     Physical Exam  Vitals and nursing note reviewed.   Constitutional:       General: She is not in acute distress.     Appearance: Normal appearance. She is not ill-appearing or toxic-appearing.   HENT:      Head: Normocephalic.      Jaw: No trismus.      Right Ear: External ear normal. No drainage. A middle ear effusion is present. No mastoid " tenderness. Tympanic membrane is not erythematous or bulging.      Left Ear: External ear normal. No drainage. A middle ear effusion is present. No mastoid tenderness. Tympanic membrane is not erythematous or bulging.      Nose: Congestion and rhinorrhea present.      Right Turbinates: Enlarged and swollen.      Left Turbinates: Enlarged and swollen.      Mouth/Throat:      Mouth: Mucous membranes are moist.      Tongue: No lesions. Tongue does not deviate from midline.      Palate: No lesions.      Pharynx: Uvula midline. Posterior oropharyngeal erythema present. No oropharyngeal exudate or uvula swelling.      Tonsils: No tonsillar exudate or tonsillar abscesses.   Eyes:      General:         Right eye: No discharge.         Left eye: No discharge.      Extraocular Movements: Extraocular movements intact.   Cardiovascular:      Rate and Rhythm: Normal rate and regular rhythm.      Pulses: Normal pulses.      Heart sounds: Normal heart sounds. No murmur heard.  Pulmonary:      Effort: Pulmonary effort is normal. No accessory muscle usage or respiratory distress.      Breath sounds: Normal breath sounds and air entry. No stridor or decreased air movement. No wheezing, rhonchi or rales.      Comments: Lungs CTA b/l  Musculoskeletal:      Cervical back: Normal range of motion. No rigidity.   Lymphadenopathy:      Head:      Right side of head: No tonsillar adenopathy.      Left side of head: No tonsillar adenopathy.      Cervical: No cervical adenopathy.   Skin:     General: Skin is warm.      Findings: No rash.   Neurological:      Mental Status: She is alert.   Psychiatric:         Behavior: Behavior is cooperative.     UA: Ketones 15 mg, trace protein, negative nitrites, negative leukocytes, negative blood  Urine hCG positive  Rapid strep negative  Assessment/Plan:     Diagnosis and Associated Orders:     1. Nausea and vomiting, unspecified vomiting type  - POCT PREGNANCY  - POCT Urinalysis  - HCG QUANTITATIVE;  Future    2. Pregnancy, unspecified gestational age  - HCG QUANTITATIVE; Future  - Referral to OB/Gyn    3. Upper respiratory tract infection, unspecified type        Comments/MDM:    Patient presents with URI symptoms, nausea and vomiting.  Urine hCG positive in clinic today.  Patient unaware of pregnancy.  Discussed resources and quantitative hCG draw.  Referral placed to [facility] pregnancy center.  Discussed pregnancy safe cold medications.  Vital signs otherwise stable and reassuring.  No evidence of hypoxia.  Lungs clear to auscultation bilaterally.  She states she will perform a home COVID test.  Recommend prenatal vitamins.  Discussed measures for nausea prevention.  All questions are answered.  Patient advised to go to ER with pelvic pain, vaginal bleeding, cramping.    I personally reviewed prior external notes and test results pertinent to today's visit.  Red flags discussed as well as indications to present to the Emergency Department.  Supportive care, natural history, differential diagnoses, and indications for immediate follow-up discussed.  Patient expresses understanding and agrees to plan.  Patient denies any other questions or concerns.    Follow-up with the primary care physician for recheck, reevaluation, and consideration of further management.      Please note that this dictation was created using voice recognition software. I have made a reasonable attempt to correct obvious errors, but I expect that there are errors of grammar and possibly content that I did not discover before finalizing the note.    This note was electronically signed by Kristy Fowler PA-C

## 2024-02-06 NOTE — LETTER
March 3, 2019        Makeda Arguello  [address] [place] [address]        Makeda was seen in our clinic today and she is cleared to return to work. Thank you.  If you have any questions or concerns, please don't hesitate to call.        Sincerely,        LINDSEY Hernandez.    Electronically Signed      Regimen: Renatosoluca Malik is supervising provider today.    Reviewed and verified Advanced Directives: No: Patient declined to create/provide document at this time     Nursing Assessment: A focused nursing assessment  was performed and the patient reports the following:  Vomiting: NO  Fever: NO  Chills: NO  Other signs of infection: NO  Bleeding: NO  Mucositis: NO  Diarrhea: NO  Constipation: NO  Anorexia: NO  Dysuria: NO  Urinary Bleeding: NO  Cough: NO   Shortness of Breath: NO  Fatigue/Weakness: NO  Numbness/Tingling: NO  Other Neuropathies: NO  Edema: NO  Rash: NO  Hand/Foot Syndrome: NO  Anxiety/Depression/Insomnia: NO  Pain: NO  Other: NO    Pre-Treatment: - Patient has valid pre-authorization  - VS completed  - Height and weight verified  - Premed orders are verified prior to administration  - Treatment parameters verified in patient protocol  - Patient is identified by first & last name, Date of birth that has been verified with the patient chairside.    Treatment: Refer to [facility] and MAR for line assessment and medication administration, Blood return confirmed before, during and after treatment administered, and Infusion pump used for non-vesicant drugs    Post Treatment: Treatment tolerated well; no adverse reaction    Education: No new instructions needed    Next appointment scheduled: 3/19- tx  Patient instructed to call the office with any questions or concerns.    Patient Discharged: patient discharged to home per self, ambulatory     Administrations This Visit       acetaminophen (TYLENOL) tablet 650 mg       Admin Date  02/06/2024 Action  Given Dose  650 mg Route  Oral Administered By  Latisha Andrews RN              diphenhydrAMINE (BENADRYL) capsule 25 mg       Admin Date  02/06/2024 Action  Given Dose  25 mg Route  Oral Administered By  Latisha Andrews RN              famotidine (PEPCID) tablet 20 mg       Admin Date  02/06/2024 Action  Given Dose  20 mg Route  Oral Administered  By  Latisha Andrews, ELVIRA              inFLIXimab-axxq (AVSOLA) 800 mg in sodium chloride 0.9 % 250 mL IVPB       Admin Date  02/06/2024 Action  New Bag Dose  800 mg Rate  50 mL/hr Route  Intravenous Administered By  Latisha Andrews, ELVIRA              sodium chloride 0.9 % flush bag 250 mL       Admin Date  02/06/2024 Action  New Bag Dose  250 mL Rate  50 mL/hr Route  Intravenous Administered By  Latisha Andrews, ELVIRA                        Fall risk 35  (Cabrera Fall Risk)    Distress Tool   N/A at this time
